# Patient Record
Sex: FEMALE | Employment: PART TIME | URBAN - METROPOLITAN AREA
[De-identification: names, ages, dates, MRNs, and addresses within clinical notes are randomized per-mention and may not be internally consistent; named-entity substitution may affect disease eponyms.]

---

## 2023-06-01 ENCOUNTER — TRANSFERRED RECORDS (OUTPATIENT)
Dept: HEALTH INFORMATION MANAGEMENT | Facility: CLINIC | Age: 58
End: 2023-06-01
Payer: COMMERCIAL

## 2023-06-01 ENCOUNTER — MEDICAL CORRESPONDENCE (OUTPATIENT)
Dept: HEALTH INFORMATION MANAGEMENT | Facility: CLINIC | Age: 58
End: 2023-06-01
Payer: COMMERCIAL

## 2023-06-02 ENCOUNTER — TRANSCRIBE ORDERS (OUTPATIENT)
Dept: OTHER | Age: 58
End: 2023-06-02

## 2023-06-02 DIAGNOSIS — H02.831 DERMATOCHALASIS OF BOTH UPPER EYELIDS: Primary | ICD-10-CM

## 2023-06-02 DIAGNOSIS — H02.834 DERMATOCHALASIS OF BOTH UPPER EYELIDS: Primary | ICD-10-CM

## 2023-08-23 ENCOUNTER — OFFICE VISIT (OUTPATIENT)
Dept: OPHTHALMOLOGY | Facility: CLINIC | Age: 58
End: 2023-08-23
Payer: COMMERCIAL

## 2023-08-23 ENCOUNTER — HOSPITAL ENCOUNTER (OUTPATIENT)
Facility: AMBULATORY SURGERY CENTER | Age: 58
End: 2023-08-23
Attending: OPHTHALMOLOGY | Admitting: OPHTHALMOLOGY
Payer: COMMERCIAL

## 2023-08-23 DIAGNOSIS — H02.831 DERMATOCHALASIS OF BOTH UPPER EYELIDS: Primary | ICD-10-CM

## 2023-08-23 DIAGNOSIS — H02.834 DERMATOCHALASIS OF BOTH UPPER EYELIDS: Primary | ICD-10-CM

## 2023-08-23 DIAGNOSIS — G35 MULTIPLE SCLEROSIS (H): ICD-10-CM

## 2023-08-23 DIAGNOSIS — H57.813 BROW PTOSIS, BILATERAL: ICD-10-CM

## 2023-08-23 PROCEDURE — 92081 LIMITED VISUAL FIELD XM: CPT | Performed by: OPHTHALMOLOGY

## 2023-08-23 PROCEDURE — 92285 EXTERNAL OCULAR PHOTOGRAPHY: CPT | Performed by: OPHTHALMOLOGY

## 2023-08-23 PROCEDURE — 99204 OFFICE O/P NEW MOD 45 MIN: CPT | Performed by: OPHTHALMOLOGY

## 2023-08-23 RX ORDER — ATENOLOL 25 MG/1
25 TABLET ORAL 2 TIMES DAILY
COMMUNITY
Start: 2023-07-05

## 2023-08-23 RX ORDER — ESZOPICLONE 2 MG/1
2 TABLET, FILM COATED ORAL AT BEDTIME
COMMUNITY
Start: 2023-07-05

## 2023-08-23 RX ORDER — LATANOPROST 50 UG/ML
1 SOLUTION/ DROPS OPHTHALMIC AT BEDTIME
COMMUNITY
Start: 2023-08-01

## 2023-08-23 RX ORDER — IBUPROFEN 800 MG/1
400 TABLET, FILM COATED ORAL EVERY 4 HOURS PRN
COMMUNITY
Start: 2023-06-17

## 2023-08-23 RX ORDER — DULOXETIN HYDROCHLORIDE 30 MG/1
1 CAPSULE, DELAYED RELEASE ORAL DAILY
COMMUNITY
Start: 2023-06-29

## 2023-08-23 RX ORDER — INTERFERON BETA-1A 30MCG/.5ML
30 KIT INTRAMUSCULAR
COMMUNITY
Start: 2022-02-24

## 2023-08-23 RX ORDER — AZELASTINE HYDROCHLORIDE 0.5 MG/ML
1 SOLUTION/ DROPS OPHTHALMIC 2 TIMES DAILY
COMMUNITY
Start: 2022-11-08

## 2023-08-23 RX ORDER — CYCLOBENZAPRINE HCL 10 MG
10 TABLET ORAL 2 TIMES DAILY
COMMUNITY
Start: 2023-07-05

## 2023-08-23 RX ORDER — ASPIRIN 81 MG/1
81 TABLET ORAL DAILY
COMMUNITY

## 2023-08-23 RX ORDER — FAMOTIDINE 20 MG/1
20 TABLET, FILM COATED ORAL 2 TIMES DAILY
COMMUNITY

## 2023-08-23 RX ORDER — LEVOTHYROXINE SODIUM 25 UG/1
25 TABLET ORAL DAILY
COMMUNITY
Start: 2023-07-11

## 2023-08-23 RX ORDER — LORAZEPAM 0.5 MG/1
0.5 TABLET ORAL EVERY 6 HOURS PRN
COMMUNITY
Start: 2021-11-23

## 2023-08-23 RX ORDER — ZOLPIDEM TARTRATE 10 MG/1
10 TABLET ORAL
COMMUNITY
Start: 2023-07-05

## 2023-08-23 ASSESSMENT — VISUAL ACUITY
OS_CC: 20/20
METHOD: SNELLEN - LINEAR
CORRECTION_TYPE: CONTACTS
OD_CC: 20/20

## 2023-08-23 ASSESSMENT — SLIT LAMP EXAM - LIDS
COMMENTS: HEAVY DERMATOCHALASIS RESTING ON LASHES
COMMENTS: HEAVY DERMATOCHALASIS RESTING ON LASHES

## 2023-08-23 ASSESSMENT — CONF VISUAL FIELD: COMMENTS: TANGENT VISUAL FIELD PERFORMED TODAY.

## 2023-08-23 ASSESSMENT — EXTERNAL EXAM - LEFT EYE: OS_EXAM: BROW PTOSIS, WITH FRONTALIS RELAXED, BROW IS BELOW SUPERIOR ORBITAL RIM AND LATERALLY INLINE WITH UPPER LASHES

## 2023-08-23 ASSESSMENT — EXTERNAL EXAM - RIGHT EYE: OD_EXAM: BROW PTOSIS, WITH FRONTALIS RELAXED, BROW IS BELOW SUPERIOR ORBITAL RIM AND LATERALLY INLINE WITH UPPER LASHES

## 2023-08-23 NOTE — PROGRESS NOTES
Oculoplastic Clinic New Patient    Patient: Nina Carrillo MRN# [id number]   YOB: 1965 Age: 58 year old   Date of Visit: Aug 23, 2023    CC: Droopy eyelids obstructing vision.              HPI:     Chief Complaint(s) and History of Present Illness(es)     Droopy Eye Lid Evaluation            Laterality: right upper lid and left upper lid          Comments    Patient referred by Dr. Jorge for dermatochalasis evaluation. Pt notes   that her lids seem to be affecting her field of vision. Lids are drooping   down further and further over time    Uses Latanoprost and azelastine drops.      Nina Carrillo is a 58 year old female who has noted gradual onset of droopy eyelids over the past years. The droopy eyelid is interfering with activities of daily living including driving, and reading. The patient denies double vision, variability of the eyelid position. Does have dry eye symptoms with contact lenses in.     She does take eye drops for glaucoma.    EXAM:     MRD1: 2 mm both eyes   Dermatochalasis with excess skin touching eyelashes  Brow ptosis with brow resting below superior orbital rim and pushing down on her eyelashes    VISUAL FIELD:  Right eye untaped:30 degrees Right eye taped:50 degrees  Left eye untaped:30 degrees Left eye taped:50 degrees    Assessment & Plan     iNna Carrillo is a 58 year old female with the following diagnoses:   1. Dermatochalasis of both upper eyelids    2. Multiple sclerosis (H)    3. 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         Both upper eyelid blepharoplasty, Internal browpexy both    [id number]    Due to significant brow ptosis, blepharoplasty alone would be unsuccesfull in addressing the lateral hooding which is obstructing vision. Blepharoplasty alone would result in sewing very thin eyelid skin to thick sub-brow skin, and even with that, fail to address lateral hooding which is obstructing  vision.    The heavy upper eyelids are causing symptoms as documented above. The condition is not secondary to underlying Sjogren's, myasthenia gravis, or polymyositis.   ANTICOAGULATION:  Aspirin, Ibuprofen (as needed)         PHOTOS DEMONSTRATE:      Significant dermatochalasis with lids resting on eyelashes and obstructing visual axis  Brow ptosis with thicker brow skin and hairs below the lateral superior orbital rim    Attending Physician Attestation: Complete documentation of historical and exam elements from today's encounter can be found in the full encounter summary report (not reduplicated in this progress note). I personally obtained the chief complaint(s) and history of present illness. I confirmed and edited as necessary the review of systems, past medical/surgical history, family history, social history, and examination findings as documented by others; and I examined the patient myself. I personally reviewed the relevant tests, images, and reports as documented above. I formulated and edited as necessary the assessment and plan and discussed the findings and management plan with the patient. Jonny Moore MD      Today with Nina Carrillo I reviewed the indications, risks, benefits, and alternatives of the proposed surgical procedure including, but not limited to, failure obtain the desired result  and need for additional surgery, bleeding, infection, loss of vision, or injury to the eye, dry eyes, and the remote possibility of permanent damage to any organ system or death with the use of anesthesia.  I provided multiple opportunities for the questions, answered all questions to the best of my ability, and confirmed that my answers and my discussion were understood.

## 2023-08-23 NOTE — NURSING NOTE
Chief Complaints and History of Present Illnesses   Patient presents with    Droopy Eye Lid Evaluation       Chief Complaint(s) and History of Present Illness(es)       Droopy Eye Lid Evaluation              Laterality: right upper lid and left upper lid              Comments    Patient referred by Dr. Jorge for dermatochalasis evaluation. Pt notes that her lids seem to be affecting her field of vision. Lids are drooping down further and further over time    Uses Latanoprost and azelastine drops.                    Taina Dimas, COT

## 2023-08-23 NOTE — LETTER
2023         RE:  :  MRN: Nina Carrillo  1965  [id number]     Dear Dr. Jorge,    Thank you for asking me to see your patient, Nina Carrillo, for an oculoplastic   consultation.  My assessment and plan are below.  For further details, please see my attached clinic note.           HPI:     Chief Complaint(s) and History of Present Illness(es)     Droopy Eye Lid Evaluation            Laterality: right upper lid and left upper lid          Comments    Patient referred by Dr. Jorge for dermatochalasis evaluation. Pt notes   that her lids seem to be affecting her field of vision. Lids are drooping   down further and further over time    Uses Latanoprost and azelastine drops.      Nina Carrillo is a 58 year old female who has noted gradual onset of droopy eyelids over the past years. The droopy eyelid is interfering with activities of daily living including driving, and reading. The patient denies double vision, variability of the eyelid position. Does have dry eye symptoms with contact lenses in.     She does take eye drops for glaucoma.    EXAM:     MRD1: 2 mm both eyes   Dermatochalasis with excess skin touching eyelashes  Brow ptosis with brow resting below superior orbital rim and pushing down on her eyelashes    VISUAL FIELD:  Right eye untaped:30 degrees Right eye taped:50 degrees  Left eye untaped:30 degrees Left eye taped:50 degrees    Assessment & Plan     Nina Carrillo is a 58 year old female with the following diagnoses:   1. Involutional ptosis, acquired, bilateral    2. Dermatochalasis of both upper eyelids    3. 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         Both upper eyelid blepharoplasty, Internal browpexy both    [id number]    Due to significant brow ptosis, blepharoplasty alone would be unsuccesfull in addressing the lateral hooding which is obstructing vision. Blepharoplasty alone would result in sewing very thin  eyelid skin to thick sub-brow skin, and even with that, fail to address lateral hooding which is obstructing vision.    The heavy upper eyelids are causing symptoms as documented above. The condition is not secondary to underlying Sjogren's, myasthenia gravis, or polymyositis.   ANTICOAGULATION:  Aspirin, Ibuprofen (as needed)        Again, thank you for allowing me to participate in the care of your patient.      Sincerely,    Jonny Moore MD  Department of Ophthalmology and Visual Neurosciences  [facility]    CC: Nina Jorge  [facility] [address] [address]  Via Fax: [phone]

## 2023-10-20 RX ORDER — SODIUM CHLORIDE, SODIUM LACTATE, POTASSIUM CHLORIDE, CALCIUM CHLORIDE 600; 310; 30; 20 MG/100ML; MG/100ML; MG/100ML; MG/100ML
INJECTION, SOLUTION INTRAVENOUS CONTINUOUS
Status: CANCELLED | OUTPATIENT
Start: 2023-10-20

## 2023-10-20 RX ORDER — OXYCODONE HYDROCHLORIDE 5 MG/1
5 TABLET ORAL
Status: CANCELLED | OUTPATIENT
Start: 2023-10-20

## 2023-10-20 RX ORDER — LIDOCAINE 40 MG/G
CREAM TOPICAL
Status: CANCELLED | OUTPATIENT
Start: 2023-10-20

## 2023-10-20 RX ORDER — OXYCODONE HYDROCHLORIDE 5 MG/1
10 TABLET ORAL
Status: CANCELLED | OUTPATIENT
Start: 2023-10-20

## 2023-10-20 RX ORDER — ONDANSETRON 2 MG/ML
4 INJECTION INTRAMUSCULAR; INTRAVENOUS EVERY 30 MIN PRN
Status: CANCELLED | OUTPATIENT
Start: 2023-10-20

## 2023-10-20 RX ORDER — ERYTHROMYCIN 5 MG/G
OINTMENT OPHTHALMIC
Qty: 7 G | Refills: 0 | Status: SHIPPED | OUTPATIENT
Start: 2023-10-20 | End: 2023-10-23 | Stop reason: HOSPADM

## 2023-10-20 RX ORDER — ACETAMINOPHEN 325 MG/1
975 TABLET ORAL ONCE
Status: CANCELLED | OUTPATIENT
Start: 2023-10-20 | End: 2023-10-20

## 2023-10-20 RX ORDER — ONDANSETRON 4 MG/1
4 TABLET, ORALLY DISINTEGRATING ORAL EVERY 30 MIN PRN
Status: CANCELLED | OUTPATIENT
Start: 2023-10-20

## 2023-10-20 RX ORDER — FENTANYL CITRATE 50 UG/ML
50 INJECTION, SOLUTION INTRAMUSCULAR; INTRAVENOUS EVERY 5 MIN PRN
Status: CANCELLED | OUTPATIENT
Start: 2023-10-20

## 2023-10-20 RX ORDER — FENTANYL CITRATE 50 UG/ML
25 INJECTION, SOLUTION INTRAMUSCULAR; INTRAVENOUS EVERY 5 MIN PRN
Status: CANCELLED | OUTPATIENT
Start: 2023-10-20

## 2023-10-20 NOTE — DISCHARGE INSTRUCTIONS
Post-operative Instructions  Ophthalmic Plastic and Reconstructive Surgery    Jonny Moore M.D.     All instructions apply to the operated eye(s) or eyelid(s).    Wound care and personal care  ? Apply ice compresses and gentle pressure 15 minutes on, 15 minutes off, for 2 days. If you are sleeping, you don't need to wake up to ice. As long as there is no further bleeding, after two days, switch to warm water compresses for five minutes, four times a day until seen by your physician.   ? You may shower or wash your hair the day after surgery. Do not go swimming for at least 2 weeks to prevent contamination of your wounds.  ? You may go for walks and light activity is ok, but no heavy (over 15 pounds) lifting, bending or excessive straining for one week.   ? Do not apply make-up to the eyes or eyelids for 2 weeks after surgery.  ? Expect some swelling, bruising, black eye (even into the lower eyelids and cheeks). Also expect serum caking, crusting and discharge from the eye and/or incisions. A small amount of surface bleeding, and depending on the type of surgery, bleeding from the inside of the eyelid, is normal for the first 48 hours.  ? Avoid straining, bending at the waist, or lifting more than 15 pounds for 1 week. Sleeping with your head elevated, such as in a recliner, for the first several days can help swelling resolve more quickly.   ? Do continue to ambulate (walk) as you normally would - being sedentary after surgery can cause blood clots.   ? Your eye(s) and eyelid(s) may be painful and tender. This is normal after surgery.      Contact information and follow-up  ? Return to the Eye Clinic for a follow-up appointment with your physician as scheduled. If no appointment has been scheduled:   - [facility] eye clinic: [phone] for an appointment with Dr. Moore within 2 to 3 weeks from your date of surgery.    After hours or on weekends and holidays, call [phone] and ask to  speak with the ophthalmologist on call.    An on call person can be reached after hours for concerns. The on call doctor should not call in medication refill requests after hours or on weekends, so please plan accordingly. An effort has been made to provide adequate pain medications following every surgery, and refills will not be provided in most instances.     Medications  ? Restart all regular home medications and eye drops. If you take Plavix or Aspirin on a regular basis, wait for 72 hours after your surgery before restarting these in order to decrease the risk of bleeding complications.  ? Avoid aspirin and aspirin-like medications (Motrin, Aleve, Ibuprofen, [place] etc) for 72 hours to reduce the risk of bleeding. You may take Tylenol (acetaminophen) for pain.  ? In addition to your home medications, take the following post-operative medications as prescribed by your physician.    ? Apply antibiotic ointment to all sutures three times a day, and into the operated eye(s) at night.   Once you run out, you can apply Vaseline or Aquaphor (over the counter) to the incisions. Don't put the Vaseline or Aquaphor into your eyes.   ? If you have ocular irritation, you can use over the counter artificial tears such as Refresh, Systane, or Blink. Do not use Visine, Clear Eyes, or any other drop that gets the red out.   ? In many cases, postoperative discomfort can be managed with Tylenol alone. If narcotic pain medication was prescribed, take pain pills at prescribed frequency as needed for pain.    WARNING:   ? Prescribed narcotic medications may make you drowsy. You must not drive a car, operate heavy machinery or drink alcohol while taking them.  ? Prescribed narcotic pain medications may cause constipation and nausea.

## 2023-11-12 ENCOUNTER — HEALTH MAINTENANCE LETTER (OUTPATIENT)
Age: 58
End: 2023-11-12

## 2024-01-11 ENCOUNTER — ANESTHESIA EVENT (OUTPATIENT)
Dept: SURGERY | Facility: AMBULATORY SURGERY CENTER | Age: 59
End: 2024-01-11
Payer: COMMERCIAL

## 2024-01-12 NOTE — ANESTHESIA PREPROCEDURE EVALUATION
"Anesthesia Pre-Procedure Evaluation    Patient: Nina Carrillo   MRN: [id number] : 1965        Procedure : Procedure(s):  Bilateral upper eyelid blepharoplasty  and internal browpexy          Past Medical History:   Diagnosis Date    Glaucoma (increased eye pressure)       No past surgical history on file.   No Known Allergies   Social History     Tobacco Use    Smoking status: Never    Smokeless tobacco: Never   Substance Use Topics    Alcohol use: Not on file      Wt Readings from Last 1 Encounters:   24 91.1 kg (200 lb 13.4 oz)        Anesthesia Evaluation            ROS/MED HX  ENT/Pulmonary:  - neg pulmonary ROS     Neurologic:  - neg neurologic ROS     Cardiovascular:  - neg cardiovascular ROS  (-) murmur   METS/Exercise Tolerance: >4 METS    Hematologic:  - neg hematologic  ROS     Musculoskeletal:  - neg musculoskeletal ROS     GI/Hepatic:  - neg GI/hepatic ROS     Renal/Genitourinary:  - neg Renal ROS     Endo:     (+)          thyroid problem, hypothyroidism,           Psychiatric/Substance Use:     (+) psychiatric history anxiety       Infectious Disease:  - neg infectious disease ROS     Malignancy:  - neg malignancy ROS     Other:  - neg other ROS          Physical Exam    Airway        Mallampati: II   TM distance: > 3 FB   Neck ROM: full   Mouth opening: > 3 cm    Respiratory Devices and Support         Dental     Comment: Teeth examined without any significant abnormality, patient denies loose, missing or chipped teeth or anything removable.         Cardiovascular          Rhythm and rate: regular and normal (-) no murmur    Pulmonary   pulmonary exam normal        breath sounds clear to auscultation           OUTSIDE LABS:  CBC: No results found for: \"WBC\", \"HGB\", \"HCT\", \"PLT\"  BMP: No results found for: \"NA\", \"POTASSIUM\", \"CHLORIDE\", \"CO2\", \"BUN\", \"CR\", \"GLC\"  COAGS: No results found for: \"PTT\", \"INR\", \"FIBR\"  POC: No results found for: \"BGM\", \"HCG\", \"HCGS\"  HEPATIC: No results found for: " "\"ALBUMIN\", \"PROTTOTAL\", \"ALT\", \"AST\", \"GGT\", \"ALKPHOS\", \"BILITOTAL\", \"BILIDIRECT\", \"MARLENI\"  OTHER: No results found for: \"PH\", \"LACT\", \"A1C\", \"CHLOÉ\", \"PHOS\", \"MAG\", \"LIPASE\", \"AMYLASE\", \"TSH\", \"T4\", \"T3\", \"CRP\", \"SED\"    Anesthesia Plan    ASA Status:  2    NPO Status:  NPO Appropriate    Anesthesia Type: MAC.   Induction: Intravenous, Propofol.   Maintenance: TIVA.        Consents    Anesthesia Plan(s) and associated risks, benefits, and realistic alternatives discussed. Questions answered and patient/representative(s) expressed understanding.     - Discussed:     - Discussed with:  Patient      - Extended Intubation/Ventilatory Support Discussed: No.      - Patient is DNR/DNI Status: No     Use of blood products discussed: No .     Postoperative Care    Pain management: IV analgesics.   PONV prophylaxis: Ondansetron (or other 5HT-3)     Comments:    Other Comments: Discussed plan for IV anesthetic with native airway. Discussed plan for deeper level of anesthetic with local anesthetic administration then lightening of anesthetic afterward. Discussed potential for need for additional sedation or conversion to general with airway management, high likelihood of recall. Discussed that we will continually be monitoring for level of comfort but if patient feels uncomfortable while waking up to alert us either verbally or through raising her hand.             Killian Lopez MD    I have reviewed the pertinent notes and labs in the chart from the past 30 days and (re)examined the patient.  Any updates or changes from those notes are reflected in this note.              # Obesity: Estimated body mass index is 37.95 kg/m  as calculated from the following:    Height as of this encounter: 1.549 m (5' 1\").    Weight as of this encounter: 91.1 kg (200 lb 13.4 oz).      "

## 2024-01-15 ENCOUNTER — ANESTHESIA (OUTPATIENT)
Dept: SURGERY | Facility: AMBULATORY SURGERY CENTER | Age: 59
End: 2024-01-15
Payer: COMMERCIAL

## 2024-01-15 ENCOUNTER — HOSPITAL ENCOUNTER (OUTPATIENT)
Facility: AMBULATORY SURGERY CENTER | Age: 59
Discharge: HOME | End: 2024-01-15
Attending: OPHTHALMOLOGY | Admitting: OPHTHALMOLOGY
Payer: COMMERCIAL

## 2024-01-15 VITALS
TEMPERATURE: 96.8 F | DIASTOLIC BLOOD PRESSURE: 71 MMHG | HEIGHT: 61 IN | RESPIRATION RATE: 14 BRPM | WEIGHT: 200.84 LBS | OXYGEN SATURATION: 98 % | BODY MASS INDEX: 37.92 KG/M2 | SYSTOLIC BLOOD PRESSURE: 129 MMHG | HEART RATE: 65 BPM

## 2024-01-15 DIAGNOSIS — H02.834 DERMATOCHALASIS OF BOTH UPPER EYELIDS: Primary | ICD-10-CM

## 2024-01-15 DIAGNOSIS — H02.831 DERMATOCHALASIS OF BOTH UPPER EYELIDS: Primary | ICD-10-CM

## 2024-01-15 PROCEDURE — 67900 REPAIR BROW DEFECT: CPT | Mod: LT

## 2024-01-15 PROCEDURE — G8918 PT W/O PREOP ORDER IV AB PRO: HCPCS

## 2024-01-15 PROCEDURE — G8907 PT DOC NO EVENTS ON DISCHARG: HCPCS

## 2024-01-15 PROCEDURE — 15823 BLEPHARP UPR EYELID XCSV SKN: CPT | Mod: E3

## 2024-01-15 PROCEDURE — 67900 REPAIR BROW DEFECT: CPT | Mod: 50 | Performed by: OPHTHALMOLOGY

## 2024-01-15 PROCEDURE — 15823 BLEPHARP UPR EYELID XCSV SKN: CPT | Mod: 50 | Performed by: OPHTHALMOLOGY

## 2024-01-15 RX ORDER — FENTANYL CITRATE 50 UG/ML
25 INJECTION, SOLUTION INTRAMUSCULAR; INTRAVENOUS
Status: DISCONTINUED | OUTPATIENT
Start: 2024-01-15 | End: 2024-01-16 | Stop reason: HOSPADM

## 2024-01-15 RX ORDER — LIDOCAINE HYDROCHLORIDE 20 MG/ML
INJECTION, SOLUTION INFILTRATION; PERINEURAL PRN
Status: DISCONTINUED | OUTPATIENT
Start: 2024-01-15 | End: 2024-01-15

## 2024-01-15 RX ORDER — ONDANSETRON 2 MG/ML
4 INJECTION INTRAMUSCULAR; INTRAVENOUS EVERY 30 MIN PRN
Status: DISCONTINUED | OUTPATIENT
Start: 2024-01-15 | End: 2024-01-16 | Stop reason: HOSPADM

## 2024-01-15 RX ORDER — ERYTHROMYCIN 5 MG/G
OINTMENT OPHTHALMIC PRN
Status: DISCONTINUED | OUTPATIENT
Start: 2024-01-15 | End: 2024-01-15 | Stop reason: HOSPADM

## 2024-01-15 RX ORDER — TETRACAINE HYDROCHLORIDE 5 MG/ML
SOLUTION OPHTHALMIC PRN
Status: DISCONTINUED | OUTPATIENT
Start: 2024-01-15 | End: 2024-01-15 | Stop reason: HOSPADM

## 2024-01-15 RX ORDER — SODIUM CHLORIDE, SODIUM LACTATE, POTASSIUM CHLORIDE, CALCIUM CHLORIDE 600; 310; 30; 20 MG/100ML; MG/100ML; MG/100ML; MG/100ML
INJECTION, SOLUTION INTRAVENOUS CONTINUOUS
Status: DISCONTINUED | OUTPATIENT
Start: 2024-01-15 | End: 2024-01-16 | Stop reason: HOSPADM

## 2024-01-15 RX ORDER — PROPOFOL 10 MG/ML
INJECTION, EMULSION INTRAVENOUS PRN
Status: DISCONTINUED | OUTPATIENT
Start: 2024-01-15 | End: 2024-01-15

## 2024-01-15 RX ORDER — OXYCODONE HYDROCHLORIDE 5 MG/1
10 TABLET ORAL EVERY 4 HOURS PRN
Status: DISCONTINUED | OUTPATIENT
Start: 2024-01-15 | End: 2024-01-16 | Stop reason: HOSPADM

## 2024-01-15 RX ORDER — ACETAMINOPHEN 325 MG/1
975 TABLET ORAL ONCE
Status: COMPLETED | OUTPATIENT
Start: 2024-01-15 | End: 2024-01-15

## 2024-01-15 RX ORDER — ERYTHROMYCIN 5 MG/G
OINTMENT OPHTHALMIC
Qty: 7 G | Refills: 1 | Status: SHIPPED | OUTPATIENT
Start: 2024-01-15

## 2024-01-15 RX ORDER — LIDOCAINE 40 MG/G
CREAM TOPICAL
Status: DISCONTINUED | OUTPATIENT
Start: 2024-01-15 | End: 2024-01-16 | Stop reason: HOSPADM

## 2024-01-15 RX ORDER — OXYCODONE HYDROCHLORIDE 5 MG/1
5 TABLET ORAL EVERY 4 HOURS PRN
Status: DISCONTINUED | OUTPATIENT
Start: 2024-01-15 | End: 2024-01-16 | Stop reason: HOSPADM

## 2024-01-15 RX ORDER — ONDANSETRON 4 MG/1
4 TABLET, ORALLY DISINTEGRATING ORAL EVERY 30 MIN PRN
Status: DISCONTINUED | OUTPATIENT
Start: 2024-01-15 | End: 2024-01-16 | Stop reason: HOSPADM

## 2024-01-15 RX ORDER — FENTANYL CITRATE 50 UG/ML
INJECTION, SOLUTION INTRAMUSCULAR; INTRAVENOUS PRN
Status: DISCONTINUED | OUTPATIENT
Start: 2024-01-15 | End: 2024-01-15

## 2024-01-15 RX ADMIN — SODIUM CHLORIDE, SODIUM LACTATE, POTASSIUM CHLORIDE, CALCIUM CHLORIDE: 600; 310; 30; 20 INJECTION, SOLUTION INTRAVENOUS at 08:04

## 2024-01-15 RX ADMIN — PROPOFOL 50 MG: 10 INJECTION, EMULSION INTRAVENOUS at 08:52

## 2024-01-15 RX ADMIN — ACETAMINOPHEN 975 MG: 325 TABLET ORAL at 07:57

## 2024-01-15 RX ADMIN — LIDOCAINE HYDROCHLORIDE 60 MG: 20 INJECTION, SOLUTION INFILTRATION; PERINEURAL at 08:33

## 2024-01-15 RX ADMIN — FENTANYL CITRATE 25 MCG: 50 INJECTION, SOLUTION INTRAMUSCULAR; INTRAVENOUS at 08:29

## 2024-01-15 RX ADMIN — FENTANYL CITRATE 25 MCG: 50 INJECTION, SOLUTION INTRAMUSCULAR; INTRAVENOUS at 08:52

## 2024-01-15 RX ADMIN — PROPOFOL 100 MG: 10 INJECTION, EMULSION INTRAVENOUS at 08:33

## 2024-01-15 NOTE — OP NOTE
PREOPERATIVE DIAGNOSES:   1. Bilateral upper eyelid dermatochalasis.   2. Bilateral brow ptosis    POSTOPERATIVE DIAGNOSES:   1. Bilateral upper eyelid dermatochalasis.   2. Bilateral brow ptosis    PROCEDURE PERFORMED:   1. Bilateral upper eyelid blepharoplasty.   2. Bilateral brow ptosis repair.     SURGEON: Jonny Moore MD    ASSISTANT: Edison Tapia MD    ANESTHESIA: Monitored with local infiltration, 50/50 mixture of 2% lidocaine with epinephrine and 0.5% Marcaine.     COMPLICATIONS: None.     ESTIMATED BLOOD LOSS: Less than 5 mL.     HISTORY: Nina Carrillo  presented with upper lid drooping interfering with superior visual field and activities of daily living. After the risks, benefits and alternatives to the proposed procedure were explained, informed consent was obtained.     DESCRIPTION OF PROCEDURE: Nina Carrillo  was brought to the operating room and placed supine on the operating table. IV sedation was given. The upper lid crease and excess upper eyelid skin was marked on each upper eyelid, and the eyelids infiltrated with local anesthetic. The area was prepped and draped in the typical sterile fashion for oculoplastic surgery. Attention was directed to the right side. Skin was incised following marked lines. Skin and orbicularis muscle flap were excised with high temperature cautery. Orbital septum was opened horizontally. The nasal and central fat pads were debulked with the high temperature cautery. Again, hemostasis was obtained. Attention was directed to the brow. Dissection was then carried into the suborbicularis plane superiorly over the orbital rim. Blunt dissection was used to elevate the tail of the brow in pre periosteal plane.  A 4-0 PDS suture was passed through the brow at the marking made at the inferior brow hairs.  This suture was then passed through the frontal periosteum 1 cm above the orbital rim nicely securing the tail of the brow in an improved position.  It was then  passed through the orbicularis in line with the marking stitch. The suture was pulled through and tied in a permanent fashion. The  upper eyelid was closed with running 6-0 plain gut suture.      Attention was then directed to the left side where the same procedure was performed. Antibiotic ointment was applied. The patient tolerated the procedure well. Nina Carrillo was taken to the recovery room in stable condition.    Jonny Moore MD

## 2024-01-15 NOTE — DISCHARGE INSTRUCTIONS
You had 975 mg of Tylenol at 8 AM. You may repeat this after 2 PM. Maximum amount of Tylenol/Acetaminophen in a 24 hour period is 4,000 mg.    Post-operative Instructions  Ophthalmic Plastic and Reconstructive Surgery    Jonny Moore M.D.     All instructions apply to the operated eye(s) or eyelid(s).    Wound care and personal care  ? Apply ice compresses and gentle pressure 15 minutes on, 15 minutes off, for 2 days. If you are sleeping, you don't need to wake up to ice. As long as there is no further bleeding, after two days, switch to warm water compresses for five minutes, four times a day until seen by your physician.   ? You may shower or wash your hair the day after surgery. Do not go swimming for at least 2 weeks to prevent contamination of your wounds.  ? You may go for walks and light activity is ok, but no heavy (over 15 pounds) lifting, bending or excessive straining for one week.   ? Do not apply make-up to the eyes or eyelids for 2 weeks after surgery.  ? Expect some swelling, bruising, black eye (even into the lower eyelids and cheeks). Also expect serum caking, crusting and discharge from the eye and/or incisions. A small amount of surface bleeding, and depending on the type of surgery, bleeding from the inside of the eyelid, is normal for the first 48 hours.  ? Avoid straining, bending at the waist, or lifting more than 15 pounds for 1 week. Sleeping with your head elevated, such as in a recliner, for the first several days can help swelling resolve more quickly.   ? Do continue to ambulate (walk) as you normally would - being sedentary after surgery can cause blood clots.   ? Your eye(s) and eyelid(s) may be painful and tender. This is normal after surgery.      Contact information and follow-up  ? Return to the Eye Clinic for a follow-up appointment with your physician as scheduled. If no appointment has been scheduled:   - [phone] for an appointment with Dr. Moore within 2 to 3  weeks from your date of surgery.     ? For severe pain, bleeding, or loss of vision, call the [facility] at [phone].    After hours or on weekends and holidays, call [phone] and ask to speak with the ophthalmologist on call.    An on call person can be reached after hours for concerns. The on call doctor should not call in medication refill requests after hours or on weekends, so please plan accordingly. An effort has been made to provide adequate pain medications following every surgery, and refills will not be provided in most instances.   Medications  ? Restart all regular home medications and eye drops. If you take Plavix or Aspirin on a regular basis, wait for 72 hours after your surgery before restarting these in order to decrease the risk of bleeding complications.  ? Avoid aspirin and aspirin-like medications (Motrin, Aleve, Ibuprofen, [place] etc) for 72 hours to reduce the risk of bleeding. You may take Tylenol (acetaminophen) for pain.  ? In addition to your home medications, take the following post-operative medications as prescribed by your physician.    ? Apply antibiotic ointment to all sutures three times a day, and into the operated eye(s) at night.   Once you run out, you can apply Vaseline or Aquaphor (over the counter) to the incisions. Don't put the Vaseline or Aquaphor into your eyes.   ? If you have ocular irritation, you can use over the counter artificial tears such as Refresh, Systane, or Blink. Do not use Visine, Clear Eyes, or any other drop that gets the red out.

## 2024-01-15 NOTE — ANESTHESIA CARE TRANSFER NOTE
Patient: Nina Carrillo    Procedure: Procedure(s):  Bilateral upper eyelid blepharoplasty  and internal browpexy       Diagnosis: Dermatochalasis of both upper eyelids [H02.831, H02.834]  Diagnosis Additional Information: No value filed.    Anesthesia Type:   MAC     Note:    Oropharynx: oropharynx clear of all foreign objects  Level of Consciousness: awake  Oxygen Supplementation: room air    Independent Airway: airway patency satisfactory and stable  Dentition: dentition unchanged  Vital Signs Stable: post-procedure vital signs reviewed and stable  Report to RN Given: handoff report given  Patient transferred to: Phase II  Comments: To Phase II. Report to RN.  VSS Resp status stable.  Handoff Report: Identifed the Patient, Identified the Reponsible Provider, Reviewed the pertinent medical history, Discussed the surgical course, Reviewed Intra-OP anesthesia mangement and issues during anesthesia, Set expectations for post-procedure period and Allowed opportunity for questions and acknowledgement of understanding    Vitals:  Vitals Value Taken Time   BP     Temp     Pulse     Resp     SpO2         Electronically Signed By: SERJIO Johns CRNA  January 15, 2024  9:07 AM

## 2024-01-15 NOTE — BRIEF OP NOTE
[facility]    Brief Operative Note    Pre-operative diagnosis: Dermatochalasis of both upper eyelids [H02.831, H02.834]  Post-operative diagnosis Same as pre-operative diagnosis    Procedure: Bilateral upper eyelid blepharoplasty, Bilateral - Eye  and internal browpexy, Bilateral - Eye    Surgeon: Surgeon(s) and Role:     * Jonny Moore MD - Primary  Anesthesia: MAC with Local   Estimated Blood Loss: Minimal    Drains: None  Specimens: * No specimens in log *  Findings:   None.  Complications: None.  Implants: * No implants in log *        Edison Tapia MD  Resident Physician, PGY-2  Department of Ophthalmology

## 2024-01-15 NOTE — ANESTHESIA POSTPROCEDURE EVALUATION
Patient: Nina Carrillo    Procedure: Procedure(s):  Bilateral upper eyelid blepharoplasty  and internal browpexy       Anesthesia Type:  MAC    Note:  Disposition: Outpatient   Postop Pain Control: Uneventful            Sign Out: Well controlled pain   PONV: No   Neuro/Psych: Uneventful            Sign Out: Acceptable/Baseline neuro status   Airway/Respiratory: Uneventful            Sign Out: Acceptable/Baseline resp. status   CV/Hemodynamics: Uneventful            Sign Out: Acceptable CV status; No obvious hypovolemia; No obvious fluid overload   Other NRE:    DID A NON-ROUTINE EVENT OCCUR? No           Last vitals:  Vitals Value Taken Time   /71 01/15/24 0915   Temp 96.8  F (36  C) 01/15/24 0907   Pulse 65 01/15/24 0915   Resp 14 01/15/24 0907   SpO2 98 % 01/15/24 0915       Electronically Signed By: Killian Lopez MD  January 15, 2024  9:44 AM

## 2024-01-16 ENCOUNTER — TELEPHONE (OUTPATIENT)
Dept: OPHTHALMOLOGY | Facility: CLINIC | Age: 59
End: 2024-01-16
Payer: COMMERCIAL

## 2024-01-16 NOTE — TELEPHONE ENCOUNTER
Patient received call to check in on her after her procedure yesterday.  She said she is doing very well and is very happy.  No pain, some swelling to be expected.  No bruising yet but that could still happen.  Advised her to call back in there are any issues.    Janes Vincent  Clinic Manager.

## 2024-01-31 ENCOUNTER — OFFICE VISIT (OUTPATIENT)
Dept: OPHTHALMOLOGY | Facility: CLINIC | Age: 59
End: 2024-01-31
Payer: COMMERCIAL

## 2024-01-31 DIAGNOSIS — H57.813 BROW PTOSIS, BILATERAL: ICD-10-CM

## 2024-01-31 DIAGNOSIS — H02.834 DERMATOCHALASIS OF BOTH UPPER EYELIDS: Primary | ICD-10-CM

## 2024-01-31 DIAGNOSIS — H02.831 DERMATOCHALASIS OF BOTH UPPER EYELIDS: Primary | ICD-10-CM

## 2024-01-31 PROCEDURE — 99024 POSTOP FOLLOW-UP VISIT: CPT | Performed by: OPHTHALMOLOGY

## 2024-01-31 ASSESSMENT — TONOMETRY
OS_IOP_MMHG: 23
IOP_METHOD: ICARE
OD_IOP_MMHG: 22

## 2024-01-31 ASSESSMENT — VISUAL ACUITY
OS_CC+: -2
CORRECTION_TYPE: GLASSES
OD_CC: 20/40
OS_CC: 20/30
METHOD: SNELLEN - LINEAR

## 2024-01-31 NOTE — NURSING NOTE
Chief Complaints and History of Present Illnesses   Patient presents with    Post Op (Ophthalmology) Both Eyes       Chief Complaint(s) and History of Present Illness(es)       Post Op (Ophthalmology) Both Eyes              Laterality: both eyes              Comments    S/p bilateral upper eyelid blepharoplasty and bilateral brow ptosis repair 1/15/24. Pt states both eyes itch quite a bit. Wonders if her eyelids are closing all the way when she is sleeping because her eyes are so dry. Curious on when she can start wearing contacts again and use mascara.  Using [employer] Brand Ats, Latanoprost and azelastine drops. Also using erythromycin ointment.                    Taina Dimas, COT

## 2024-01-31 NOTE — PROGRESS NOTES
"Chief Complaint(s) and History of Present Illness(es)     Post Op (Ophthalmology) Both Eyes            Laterality: both eyes          Comments    S/p bilateral upper eyelid blepharoplasty and bilateral brow ptosis repair   1/15/24. Pt states both eyes itch quite a bit. Wonders if her eyelids are   closing all the way when she is sleeping because her eyes are so dry.   Curious on when she can start wearing contacts again and use mascara.  Using [employer] Brand Ats, Latanoprost and azelastine drops. Also using   erythromycin ointment.                Doing well. Happy with outcome.    Patient Instructions   - Apply warm compresses for 1 minute two to three times a day until your bruising has resolved. You can continue this for one more month.   - Cool compresses can help with swelling and itching, you can alternate cool compresses with warm compresses if you find it helpful.  - Apply Aquaphor or Vaseline to the incision at bedtime until it is smooth.    - If you have symptoms of eye irritation, it is good to use over the counter artificial tears. Good brands include Refresh, Blink, and Systane. Do NOT get drops that are for \"red eyes.\"   - It is normal for the incision to appear raised, red, itch and have small lumps. You can gently massage any small bumps along the incision line. These can take up to six months to resolve.    No follow-ups on file.      Attending Physician Attestation: Complete documentation of historical and exam elements from today's encounter can be found in the full encounter summary report (not reduplicated in this progress note). I personally obtained the chief complaint(s) and history of present illness. I confirmed and edited as necessary the review of systems, past medical/surgical history, family history, social history, and examination findings as documented by others; and I examined the patient myself. I personally reviewed the relevant tests, images, and reports as documented above. I " formulated and edited as necessary the assessment and plan and discussed the findings and management plan with the patient and family. I personally reviewed the ophthalmic test(s) associated with this encounter, agree with the interpretation(s) as documented by the resident/fellow, and have edited the corresponding report(s) as necessary. Jonny Moore MD

## 2024-04-03 ENCOUNTER — OFFICE VISIT (OUTPATIENT)
Dept: OPHTHALMOLOGY | Facility: CLINIC | Age: 59
End: 2024-04-03
Payer: COMMERCIAL

## 2024-04-03 DIAGNOSIS — H57.813 BROW PTOSIS, BILATERAL: ICD-10-CM

## 2024-04-03 DIAGNOSIS — H02.834 DERMATOCHALASIS OF BOTH UPPER EYELIDS: Primary | ICD-10-CM

## 2024-04-03 DIAGNOSIS — H02.831 DERMATOCHALASIS OF BOTH UPPER EYELIDS: Primary | ICD-10-CM

## 2024-04-03 PROCEDURE — 99024 POSTOP FOLLOW-UP VISIT: CPT | Performed by: OPHTHALMOLOGY

## 2024-04-03 ASSESSMENT — VISUAL ACUITY
METHOD: SNELLEN - LINEAR
OD_SC: 20/20
OS_SC: 20/20
OD_SC+: -1

## 2024-04-03 NOTE — PROGRESS NOTES
Chief Complaint(s) and History of Present Illness(es)     Post Op (Ophthalmology) Both Eyes            Laterality: both eyes          Comments    Pt returns for 3 month post-op following bilateral upper lid   blepharoplasty and bilateral brow ptosis repair. Pt is happy with surgical   outcome. She states the procedure was amazing and very easy. She does   still feel a few small bumps on the incision site temporally left upper   lid. She wonders if it would be appropriate to use mederma skin care   lotion on her lids to reduce scarring. She is still using erythromycin   aidee.                Nina Carrillo is about 2 months status post op.  She is doing well. Looks very good. Margin to reflex distance 1 about 4 mm both eyes no lagophthalmos.     She will follow up with me on an as needed basis.    Complete documentation of historical and exam elements from today's encounter can be found in the full encounter summary report (not reduplicated in this progress note). I personally obtained the chief complaint(s) and history of present illness.  I confirmed and edited as necessary the review of systems, past medical/surgical history, family history, social history, and examination findings as documented by others; and I examined the patient myself. I personally reviewed the relevant tests, images, and reports as documented above. I formulated and edited as necessary the assessment and plan and discussed the findings and management plan with the patient and family. - Jonny Moore MD

## 2024-04-03 NOTE — NURSING NOTE
Chief Complaints and History of Present Illnesses   Patient presents with    Post Op (Ophthalmology) Both Eyes     Chief Complaint(s) and History of Present Illness(es)       Post Op (Ophthalmology) Both Eyes              Laterality: both eyes              Comments    Pt returns for 3 month post-op following bilateral upper lid blepharoplasty and bilateral brow ptosis repair. Pt is happy with surgical outcome. She states the procedure was amazing and very easy. She does still feel a few small bumps on the incision site temporally left upper lid. She wonders if it would be appropriate to use mederma skin care lotion on her lids to reduce scarring. She is still using erythromycin aidee.

## 2024-08-18 ENCOUNTER — HEALTH MAINTENANCE LETTER (OUTPATIENT)
Age: 59
End: 2024-08-18

## 2025-06-15 ENCOUNTER — HEALTH MAINTENANCE LETTER (OUTPATIENT)
Age: 60
End: 2025-06-15

## 2025-07-11 ENCOUNTER — TELEPHONE (OUTPATIENT)
Dept: OPHTHALMOLOGY | Facility: CLINIC | Age: 60
End: 2025-07-11
Payer: COMMERCIAL

## 2025-07-11 NOTE — TELEPHONE ENCOUNTER
[facility] Call Center    Phone Message    May a detailed message be left on voicemail: yes     Reason for Call: Other: Patient is requesting to speak with care team regarding a previous eye surgery that patient had with provider. Patient is experiencing eye irritation and wants to see if she can get a prescription for an eye ointment sent to [facility] in [place]. She also wants to make the team aware that her grandson has pink eye and she slept in the bed with him last night, however she doesn't believe she has pink eye. Please call patient to advise. Did confirm phone number.      Please call patient at [phone].    Action Taken: Message routed to:  Other: EYE     Travel Screening: Not Applicable

## 2025-07-13 DIAGNOSIS — H02.834 DERMATOCHALASIS OF BOTH UPPER EYELIDS: ICD-10-CM

## 2025-07-13 DIAGNOSIS — H02.831 DERMATOCHALASIS OF BOTH UPPER EYELIDS: ICD-10-CM

## 2025-07-13 RX ORDER — ERYTHROMYCIN 5 MG/G
OINTMENT OPHTHALMIC AT BEDTIME
COMMUNITY

## 2025-07-15 RX ORDER — ERYTHROMYCIN 5 MG/G
OINTMENT OPHTHALMIC
Qty: 7 G | Refills: 1 | Status: SHIPPED | OUTPATIENT
Start: 2025-07-15

## (undated) DEVICE — GLOVE BIOGEL PI MICRO SZ 7.5 48575

## (undated) DEVICE — ESU ELEC NDL 1" COATED/INSULATED E1465

## (undated) DEVICE — SYR 03ML LL W/O NDL

## (undated) DEVICE — MARKER SKIN DOUBLE TIP W/FLEXI-RULER W/LABELS

## (undated) DEVICE — PACK MINOR EYE

## (undated) DEVICE — NDL 30GA 1" 305128

## (undated) DEVICE — SOL WATER IRRIG 1000ML BOTTLE 07139-09

## (undated) DEVICE — ESU EYE HIGH TEMP 65410-183

## (undated) DEVICE — NDL 30GA 0.5" 305106

## (undated) RX ORDER — FENTANYL CITRATE 50 UG/ML
INJECTION, SOLUTION INTRAMUSCULAR; INTRAVENOUS
Status: DISPENSED
Start: 2024-01-15

## (undated) RX ORDER — ACETAMINOPHEN 325 MG/1
TABLET ORAL
Status: DISPENSED
Start: 2024-01-15